# Patient Record
Sex: FEMALE | Race: WHITE | Employment: FULL TIME | URBAN - METROPOLITAN AREA
[De-identification: names, ages, dates, MRNs, and addresses within clinical notes are randomized per-mention and may not be internally consistent; named-entity substitution may affect disease eponyms.]

---

## 2021-10-06 ENCOUNTER — APPOINTMENT (OUTPATIENT)
Dept: PHYSICAL THERAPY | Age: 31
End: 2021-10-06

## 2021-12-30 ENCOUNTER — HOSPITAL ENCOUNTER (OUTPATIENT)
Dept: PHYSICAL THERAPY | Age: 31
Discharge: HOME | End: 2021-12-30
Payer: OTHER GOVERNMENT

## 2021-12-30 PROCEDURE — 97162 PT EVAL MOD COMPLEX 30 MIN: CPT

## 2021-12-30 PROCEDURE — 97112 NEUROMUSCULAR REEDUCATION: CPT

## 2021-12-30 PROCEDURE — 97535 SELF CARE MNGMENT TRAINING: CPT

## 2021-12-30 NOTE — PROGRESS NOTES
[facility]  [address] [place], [address]  Ph [phone]  Fx [phone]    Plan of Care/ Statement of Necessity for Physical Therapy Services    Patient name: Topher Berkowitz Start of Care: 2021   Referral source: Vonnie Bowens NP : 1990    Medical Diagnosis: Other symptoms and signs involving the genitourinary system [R39.89]   Onset Date:vaginal delivery 17    Treatment Diagnosis: Other symptoms and signs involving the genitourinary system [R39.89]   Prior Hospitalization: see medical history Provider#: [id number]   Medications: Verified on Patient summary List    Comorbidities: PMHx/Surgical Hx: 3 pregnancies, 2 vaginal deliveries with grade 1 tearing and both with hemorrhaging afterwards (of note pt delivered one stillborn child) ; Multiple sclerosis   Prior Level of Function: functionally independent, active lifestyle and mother          The Plan of Care and following information is based on the information from the initial evaluation. Assessment/ key information: Patient is a 33year old female presenting to Physical Therapy with c/o vaginal heaviness/pressure which is limiting ability function at previous level. Patient has pain complaints with intercourse and difficulty evacuating stool due to feeling of prolapse exacerbation. Patient presents with decreased strength, coordination, and endurance of the pelvic floor muscles and decreased strength of postural stabilizers. Patient presents with limited understanding of  bowel anatomy/function, dietary irritants, and urge suppression.  I feel patient would benefit from skilled therapeutic intervention to optimize highest functional level possible.      Patient will continue to benefit from skilled PT services to modify and progress therapeutic interventions, address functional mobility deficits, address ROM deficits, address strength deficits, analyze and address soft tissue restrictions, analyze and cue movement patterns, analyze and modify body mechanics/ergonomics and assess and modify postural abnormalities to attain remaining goals. Evaluation Complexity History HIGH Complexity :3+ comorbidities / personal factors will impact the outcome/ POC ; Examination MEDIUM Complexity : 3 Standardized tests and measures addressing body structure, function, activity limitation and / or participation in recreation  ;Presentation MEDIUM Complexity : Evolving with changing characteristics  ; Clinical Decision Making MEDIUM  Overall Complexity Rating: MEDIUM    Problem List: Pelvic pain/dysfunction, Decreased pelvic floor mm awareness, Decreased pelvic floor mm strength and Improper voiding habits  Treatment Plan may include any combination of the following: Therapeutic exercise, Urge suppression techniques, Neuromuscular re-education, Manual therapy, Physical agent/modality and Patient education  Patient / Family readiness to learn indicated by: asking questions, trying to perform skills and interest  Persons(s) to be included in education: patient (P)  Barriers to Learning/Limitations: None  Measures taken if barriers to learning: NA  Patient Goal (s): to strengthen my muscles and decrease the bulge feeling, also to enjoy intercourse more  Patient Self Reported Health Status: good  Rehabilitation Potential: good    Short term goals: To be achieved in 4 weeks:  Patient will be able to maintain pelvic floor contraction for 10 seconds, 10 repetitions with no accessory substitution or breath holding.   Status at eval: PFMC 5 seconds, 3 repetitions     Pt will demonstrate proper toileting posture and perform colon massage daily for improved bowel motility to improve QOL and  prevent weakening of PFM due to straining with BMs.    Eval: BM every 2-3 days, Type 4 and BM causes prolapse heaviness/pressure complaints to worsen      Patient will be able to use Pelvic organ prolapse management tools/exercises to reduce pelvic pressure/heaviness to less than VAS 2/10 after BM /end of day  Status at eval: always pressure 1/10 discomfort and worst 4/10 with BM and menstrual cycle        Long term goals: To be achieved in 10 weeks:  Patient will tolerate LG dilator, dynamic insertion for 10 minutes (or intercourse) with pain no more than 1/10 and less feeling of \"something is in the way\"  Status at eval: intercourse discomfort/pain 3/10 with deep penetration     Patient will demonstrate pelvic floor muscle contraction 4/5 and ability to maintain contraction for 10 seconds, 10 repetitions. Status at eval: PFM 3-/5, 4 second hold, 3 repetitions     Patient will note improvement in prolapse complaints evidenced by 75% less vaginal heaviness/pressure throughout the day  Status at Eval: Pt notes heaviness/pressure at vaginal opening ranging 1-4/10; never \"not present\"     Patient will demonstrate independence with management tools and exercise program that are beneficial for current condition in order to feel comfortable with Pelvic floor PT D/C and not fear exacerbation of current condition or symptoms returning. Status at eval : patient fearful of return to exercise and unaware of what activities to avoid to avoid exacerbation of current condition    Frequency / Duration: Patient to be seen 1 times per week for 10 weeks. Patient/ Caregiver education and instruction: Diagnosis, prognosis, Proper Voiding Habits, Diet, Pain Management, Exercises and Bladder Retraining   [x]  Plan of care has been reviewed with PTA    Certification Period: SALAS Childers, PT 12/30/2021 3:25 PM    ________________________________________________________________________    I certify that the above Therapy Services are being furnished while the patient is under my care. I agree with the treatment plan and certify that this therapy is necessary.     [de-identified] Signature:____________________  Date:____________Time:____________ Mary , NP      Please sign and return to [facility]  [address] [place], [address]  Ph [phone]  Fx [phone]

## 2021-12-30 NOTE — PROGRESS NOTES
Physical Therapy Evaluation        Patient Name: Geraldo Mueller  Date:2021  : 1990  [x]  Patient  Verified  Payor:  / Plan: Yovanny Sandoval 72 / Product Type:  /    In time:1145  Out time:1230  Total Treatment Time (min): 45  Visit #: 1 of 10    Medicare/BCBS Only   Total Timed Codes (min):  20 1:1 Treatment Time:  45       Treatment Area: Other symptoms and signs involving the genitourinary system [R39.89]    SUBJECTIVE  Pain Level (0-10 scale): 0  []constant []intermittent []improving []worsening []no change since onset    Any medication changes, allergies to medications, adverse drug reactions, diagnosis change, or new procedure performed?: [x] No    [] Yes (see summary sheet for update)  Subjective functional status/changes:     PLOF: functionally independent, active lifestyle and mother  Limitations to PLOF: Decreased strength, coordination and endurance  Mechanism of Injury: vaginal delivery 17  Current symptoms/Complaints: Patient c/o vaginal heaviness/pressure since second delivery; states labor felt different and since then, feels like \"a bulge or a ball\" and reports some days are worse than others, when closer to menstruation, worsens. Pt states she was told by GYN that \"it's not your bladder\" but recommended PF PT. Pt states that anything with squatting position is difficult and feels like she is unable to push to have BM \"because I am worried that its going to come out\" ; pt also c/o pain/discomfort with intercourse. Previous Treatment/Compliance: none  PMHx/Surgical Hx: 3 pregnancies, 2 vaginal deliveries with grade 1 tearing and both with hemorrhaging afterwards (of note pt delivered one stillborn child) ;  Multiple sclerosis  Work Hx:   Living Situation: 2 children and spouse (deployed)   Pt Goals: \"to strengthen my muscles and decrease the bulge feeling, also to enjoy intercourse more\"   Barriers: []pain []financial []time []transportation []other  Motivation: high  Substance use: [x]Alcohol [x]Tobacco []other:   Cognition: A & O x 4    Other:    Current urinary complaint  None    Daytime urinary frequency:  Every 3 hour(s) during the day    Nocturia:  0x/ night    Patient has failed previous pelvic floor muscle training? [] Yes    [x] No    Bowel function:  Constipation,  every 2-3 days BM  Stool Type ([place]): 4  Toileting position/modifications: IBS-C, standard toileting    Fecal Incontinence present? none    Pain complaint:  SIJ pain    Pain scale:  Verbal Analog scale: worst pain 5 \"just started recently\"     Pain description:  daily: intermittent  worsens with: increased activity/movement  improves with: rest    Vaginal heaviness/pressure/discomfort  Worst: 4/10 (menstration, BM, coughing)  Best: 1/10  [place] discomfort 3/10 with deep penetration     Diet: well balanced, \"I don't normally eat\" ; a lot of pasta    Fluid intake:    Fluid  Amount per day   Water 64 oz +   Caffeine 1/2 cup coffee   Alcohol 2 glasses wine in a week             Physical Exam Objective Findings:    Gait:  [x] Normal     [] Abnormal:    Pelvic Floor Assessment  Patient was educated on pelvic floor anatomy, structure, and function and implications for current presentation of s/s. Patient consents to pelvic floor assessment.      Pelvic girdle alignment:  level pelvis, no SI joint tenderness, no dysfunction or obliquity       Postural assessment:  stance with hyperlordotic lumbar spine    Range of Motion:  Patient moves freely through functional low back and hip ROM     External trigger point/ muscle tenderness:    Superficial PFM tenderness/restriction none    PFM Screen:    Skin Integrity:  [x] Healthy [] Red  [] Labia Atrophy [] Fragile    Sensation: [x] Intact [] Diminished:    Muscle Bulk: [x] Symmetrical  [] Well-developed [] Atrophied:  []L   []R   []B    Prolapse: [] Cystocele:   [] Rectocele:  [x] Uterine prolapse: likely grade 2, did not assess due to menstrual cycle starting today    Ability to actively bulge: min  Bulge with cough mod; bulge mod with knack prior to cough    Pelvic floor manual exam: Performed via internal vaginal assessment  female-upon vaginal palpation of the pelvic floor, the following was noted: mild hypotonicity, mild decreased sensation  Introitus restriction/TTP (reported as hands on a clock):  none    Deep PFM Tenderness/Restriction:  No pain    Pelvic floor MMT    PERF (Performance/Endurance/Repetitions//Flicks): 3-/9/1/5    Pelvic muscle release pattern  3 - good release      25 min [x]Eval                  []Re-Eval       10 min Self Care: Review and handout provided on the following: PFM anatomy, structure and function as it pertains to current s/s, complaints and condition. Reviewed expectations for PFPT and POC. HEP 5-10/5-10/10, 1-2/2-4/10 2-3 times/day sitting/supine, supine if heaviness/pressure    Rationale:  Increase awareness and understanding of current condition to improve patients ability to independently and effectively attain goals and progress towards long term management of current condition. 10 min Neuromuscular Re-education:  []  See flow sheet :via internal digital facilitation and feedback for long and short isolation lulu   Rationale: increase strength, improve coordination and increase proprioception  to improve the patients ability to progress towards goals            With   [] TE   [] TA   [x] neuro   [x] other: Patient Education: [x] Review HEP    [] Progressed/Changed HEP based on:   [] positioning   [] body mechanics   [] transfers   [] heat/ice application    [] other:           Pain Level (0-10 scale) post treatment: 0    ASSESSMENT/Changes in Function: Patient is a 33year old female presenting to Physical Therapy with c/o vaginal heaviness/pressure which is limiting ability function at previous level.  Patient has pain complaints with intercourse and difficulty evacuating stool due to feeling of prolapse exacerbation. Patient presents with decreased strength, coordination, and endurance of the pelvic floor muscles and decreased strength of postural stabilizers. Patient presents with limited understanding of  bowel anatomy/function, dietary irritants, and urge suppression. I feel patient would benefit from skilled therapeutic intervention to optimize highest functional level possible. Patient will continue to benefit from skilled PT services to modify and progress therapeutic interventions, address functional mobility deficits, address ROM deficits, address strength deficits, analyze and address soft tissue restrictions, analyze and cue movement patterns, analyze and modify body mechanics/ergonomics and assess and modify postural abnormalities to attain remaining goals. [x]  See Plan of Care  []  See progress note/recertification  []  See Discharge Summary         Progress towards goals / Updated goals:  Short term goals: To be achieved in 4 weeks:  Patient will be able to maintain pelvic floor contraction for 10 seconds, 10 repetitions with no accessory substitution or breath holding. Status at eval: PFMC 5 seconds, 3 repetitions    Pt will demonstrate proper toileting posture and perform colon massage daily for improved bowel motility to improve QOL and  prevent weakening of PFM due to straining with BMs. Eval: BM every 2-3 days, Type 4 and BM causes prolapse heaviness/pressure complaints to worsen     Patient will be able to use Pelvic organ prolapse management tools/exercises to reduce pelvic pressure/heaviness to less than VAS 2/10 after BM /end of day  Status at eval: always pressure 1/10 discomfort and worst 4/10 with BM and menstrual cycle      Long term goals:  To be achieved in 10 weeks:  Patient will tolerate LG dilator, dynamic insertion for 10 minutes (or intercourse) with pain no more than 1/10 and less feeling of \"something is in the way\"  Status at eval: intercourse discomfort/pain 3/10 with deep penetration    Patient will demonstrate pelvic floor muscle contraction 4/5 and ability to maintain contraction for 10 seconds, 10 repetitions. Status at eval: PFM 3-/5, 4 second hold, 3 repetitions    Patient will note improvement in prolapse complaints evidenced by 75% less vaginal heaviness/pressure throughout the day  Status at Eval: Pt notes heaviness/pressure at vaginal opening ranging 1-4/10; never \"not present\"    Patient will demonstrate independence with management tools and exercise program that are beneficial for current condition in order to feel comfortable with Pelvic floor PT D/C and not fear exacerbation of current condition or symptoms returning.    Status at eval : patient fearful of return to exercise and unaware of what activities to avoid to avoid exacerbation of current condition    PLAN  []  Upgrade activities as tolerated     [x]  Continue plan of care  []  Update interventions per flow sheet       []  Discharge due to:_  []  Other:_  GOAL: core/PF strength for prolapse; plan: (give/set justine next session for 5/5/10, 2/2/10); f/u bowel routine: posture, ILU, elongation; hypopressives; biofeedback;  core progressions, functional lifting/carrying; (dilator as needed if appropriate, likely just needs MT and PF strength/hypopressives)    Maddison Carbone, PT 12/30/2021  11:48 AM

## 2022-01-04 ENCOUNTER — HOSPITAL ENCOUNTER (OUTPATIENT)
Dept: PHYSICAL THERAPY | Age: 32
Discharge: HOME | End: 2022-01-04
Payer: OTHER GOVERNMENT

## 2022-01-04 PROCEDURE — 97112 NEUROMUSCULAR REEDUCATION: CPT

## 2022-01-04 PROCEDURE — 97535 SELF CARE MNGMENT TRAINING: CPT

## 2022-01-04 NOTE — PROGRESS NOTES
PT DAILY TREATMENT NOTE    Patient Name: Lelo Blood  Date:2022  : 1990  [x]  Patient  Verified  Payor:  / Plan: Yovanny Sandoval 72 / Product Type:  /    In time:255  Out time:337  Total Treatment Time (min): 42  Total Timed Codes (min): 42  1:1 Treatment Time ([address] only): 42   Visit #: 2 of 10    Treatment Area: Other symptoms and signs involving the genitourinary system [R39.89]    SUBJECTIVE  Pain Level (0-10 scale): 0/10  Any medication changes, allergies to medications, adverse drug reactions, diagnosis change, or new procedure performed?: [x] No    [] Yes (see summary sheet for update)  Subjective functional status/changes:   [] No changes reported  Patient reports:mixed compliance with current HEP. She stated that she had difficulty with dong the quicks. OBJECTIVE        10 min Self Care: Thorough review and handout provided on the following: Helped install [employer] justine, bowel routine, fiber, and abdominal massage   Rationale:  Increase awareness and understanding of current condition to improve patients ability to independently and effectively attain goals and progress towards long term management of current condition. 32 min Neuromuscular Re-education:  [x]  See flow sheet : surface EMG   Rationale: Increase pelvic floor muscle strength, Improve quality of pelvic floor contractions and Decrease resting tone of the pelvic floor in order to Improve frequency and ease of bowel movements, Improve ability to engage in sexual intercourse and Improve ability to perform ADLs.             With   [] TE   [] TA   [] neuro   [] other: Patient Education: [x] Review HEP    [] Progressed/Changed HEP based on:   [] positioning   [] body mechanics   [] transfers   [] heat/ice application    [] other:      Other Objective/Functional Measures: Biofeedback expectations and implications were reviewed with patient prior to intervention,   Performed sEMG with perianal electrodes as follows:    Position, initial resting tone Work/Rest/Reps Comments :Average   Supine   5/10/10     Resting at 3.9   uV min = .5 uV     Work at   18.7    uV  Max = 57.0 uV   Supine     2/4/10   Resting at  5. Beula Poisson = .6 uV     working at 14.5 uV  Max = 36.8 uV     Neuromuscular Re-education was provided with visual, verbal and tactile cueing throughout intervention  Results and implications for treatment and HEP were reviewed in detail with patient during and following today's intervention. Pain Level (0-10 scale) post treatment: 0/10    ASSESSMENT/Changes in Function: Patient tolerated today's session well with no c/o pain. Patient demonstrated understanding of today's instruction, education, and recommendations. Patient is progressing appropriately towards goals. Surface EMG was performed to obtain a baseline for her pelvic floor activity. Patient was able to contract and relax the pelvic floor well. Her work to rest was delayed which is reflected in the average rest.  She demonstrated good muscle recruitment. Bowel routine, fiber consumption, defecation positioning, and abdominal massage was discussed. Patient will continue to benefit from skilled PT services to modify and progress therapeutic interventions, address functional mobility deficits, address ROM deficits, address strength deficits, analyze and address soft tissue restrictions, analyze and cue movement patterns, analyze and modify body mechanics/ergonomics, assess and modify postural abnormalities and instruct in home and community integration to attain remaining goals. [x]  See Plan of Care  []  See progress note/recertification  []  See Discharge Summary         Progress towards goals / Updated goals:  Short term goals: To be achieved in 4 weeks:  Patient will be able to maintain pelvic floor contraction for 10 seconds, 10 repetitions with no accessory substitution or breath holding.   Status at [facility]: [facility] 5 seconds, 3 repetitions   Current:  Patient installed the [address] justine and surface EMG was performed. 1/4/2022 Progressing    Pt will demonstrate proper toileting posture and perform colon massage daily for improved bowel motility to improve QOL and  prevent weakening of PFM due to straining with BMs.    Eval: BM every 2-3 days, Type 4 and BM causes prolapse heaviness/pressure complaints to worsen      Patient will be able to use Pelvic organ prolapse management tools/exercises to reduce pelvic pressure/heaviness to less than VAS 2/10 after BM /end of day  Status at eval: always pressure 1/10 discomfort and worst 4/10 with BM and menstrual cycle        Long term goals: To be achieved in 10 weeks:  Patient will tolerate LG dilator, dynamic insertion for 10 minutes (or intercourse) with pain no more than 1/10 and less feeling of \"something is in the way\"  Status at eval: intercourse discomfort/pain 3/10 with deep penetration     Patient will demonstrate pelvic floor muscle contraction 4/5 and ability to maintain contraction for 10 seconds, 10 repetitions. Status at eval: PFM 3-/5, 4 second hold, 3 repetitions     Patient will note improvement in prolapse complaints evidenced by 75% less vaginal heaviness/pressure throughout the day  Status at Eval: Pt notes heaviness/pressure at vaginal opening ranging 1-4/10; never \"not present\"     Patient will demonstrate independence with management tools and exercise program that are beneficial for current condition in order to feel comfortable with Pelvic floor PT D/C and not fear exacerbation of current condition or symptoms returning.    Status at eval : patient fearful of return to exercise and unaware of what activities to avoid to avoid exacerbation of current condition       PLAN  []  Upgrade activities as tolerated     [x]  Continue plan of care  [x]  Update interventions per flow sheet       []  Discharge due to:_  []  Other:_  GOAL: core/PF strength for prolapse; plan: (give/set justine next session for 5/5/10, 2/2/10); f/u bowel routine: posture, ILU, elongation; hypopressives; biofeedback;  core progressions, functional lifting/carrying; (dilator as needed if appropriate, likely just needs MT and PF strength/hypopressives    Pamela Cullen, PT 1/4/2022  2:53 PM    Future Appointments   Date Time Provider Jerry Fajardo   1/14/2022  2:45 PM [facility]   1/20/2022  3:30 PM Shelbie Brown, GUERO [facility]   1/26/2022  2:45 PM [facility]

## 2022-01-14 ENCOUNTER — HOSPITAL ENCOUNTER (OUTPATIENT)
Dept: PHYSICAL THERAPY | Age: 32
Discharge: HOME | End: 2022-01-14
Payer: OTHER GOVERNMENT

## 2022-01-14 PROCEDURE — 97112 NEUROMUSCULAR REEDUCATION: CPT

## 2022-01-14 PROCEDURE — 97530 THERAPEUTIC ACTIVITIES: CPT

## 2022-01-14 PROCEDURE — 97110 THERAPEUTIC EXERCISES: CPT

## 2022-01-14 PROCEDURE — 97535 SELF CARE MNGMENT TRAINING: CPT

## 2022-01-14 NOTE — PROGRESS NOTES
PT DAILY TREATMENT NOTE    Patient Name: Army Crisostomo  Date:2022  : 1990  [x]  Patient  Verified  Payor:  / Plan: Yovanny Sandoval 72 / Product Type:  /    In time:2:51 PM  Out time:3:31 PM  Total Treatment Time (min): 40  Total Timed Codes (min): 40  1:1 Treatment Time ( [address] only): NA   Visit #: 3 of 10    Treatment Area: Other symptoms and signs involving the genitourinary system [R39.89]    SUBJECTIVE  Pain Level (0-10 scale): 0/10  Any medication changes, allergies to medications, adverse drug reactions, diagnosis change, or new procedure performed?: [x] No    [] Yes (see summary sheet for update)  Subjective functional status/changes:   [] No changes reported  Patient reports: fair compliance with current HEP. Patient reports that she was very fatigued this week and was unable to perform all of her [facility]. OBJECTIVE    10 min Therapeutic Exercise:  [x] See flow sheet :   Rationale: Increase pelvic floor muscle strength, Improve quality of pelvic floor contractions, Decrease resting tone of the pelvic floor, Increase tissue extensibility of the pelvic floor muscles, Increase core strength, Inhibit abnormal muscle activity and Improve lumbosacral and coccygeal mobility in order to Increase urinary continence, Decrease urinary urgency, Increase ability to delay urination, Decrease frequency of urination, Decrease nocturia, Increase fecal continence, Decrease bowel urgency, Improve ability to engage in sexual intercourse and Improve ability to perform ADLs. 10 min Self Care: Thorough review and handout provided on the following:   -follow up on \"ILU\" massage  -education provided on prolapse  -review of core HEP (see chart for handout)   Rationale:  Increase awareness and understanding of current condition to improve patients ability to independently and effectively attain goals and progress towards long term management of current condition.      10 min Therapeutic Activity:  [x]  See flow sheet :   Rationale: Increase pelvic floor muscle strength, Improve quality of pelvic floor contractions, Decrease resting tone of the pelvic floor, Increase tissue extensibility of the pelvic floor muscles, Inhibit abnormal muscle activity and Improve lumbosacral and coccygeal mobility in order to Increase urinary continence, Decrease urinary urgency, Increase ability to delay urination, Decrease frequency of urination, Decrease nocturia, Increase fecal continence, Improve frequency and ease of bowel movements and Improve ability to perform ADLs. 10 min Neuromuscular Re-education:  [x]  See flow sheet :   Rationale: Increase pelvic floor muscle strength, Improve quality of pelvic floor contractions, Decrease resting tone of the pelvic floor, Increase tissue extensibility of the pelvic floor muscles, Increase core strength, Inhibit abnormal muscle activity and Improve lumbosacral and coccygeal mobility in order to Increase urinary continence, Decrease urinary urgency, Increase ability to delay urination, Decrease frequency of urination, Increase fecal continence, Decrease bowel urgency, Improve ability to engage in sexual intercourse and Improve ability to perform ADLs. With   [] TE   [] TA   [] neuro   [] other: Patient Education: [x] Review HEP    [] Progressed/Changed HEP based on:   [] positioning   [] body mechanics   [] transfers   [] heat/ice application    [] other:      Other Objective/Functional Measures:      Pain Level (0-10 scale) post treatment: 0/10    ASSESSMENT/Changes in Function: Patient tolerated today's session well with no c/o pain. Patient demonstrated understanding of today's instruction, education, and recommendations. Patient is progressing appropriately towards goals.  Patient making slow progress towards goals with inability to be compliant at home contributing due to patient's busy home life---may consider increasing patient's physical therapy attendance to 2x/week at next PN. Patient does well with exercises (hips elevated) and denies pressure/heaviness today. Patient requires maximal cueing, verbal and tactile, for appropriate TA activation--noted patient likes to substitute with rectus bulge. Patient will continue to benefit from skilled PT services to modify and progress therapeutic interventions, address functional mobility deficits, address ROM deficits, address strength deficits, analyze and address soft tissue restrictions, analyze and cue movement patterns, analyze and modify body mechanics/ergonomics, assess and modify postural abnormalities and instruct in home and community integration to attain remaining goals. [x]  See Plan of Care  []  See progress note/recertification  []  See Discharge Summary                Progress towards goals / Updated goals:  Short term goals: To be achieved in 4 weeks:  Patient will be able to maintain pelvic floor contraction for 10 seconds, 10 repetitions with no accessory substitution or breath holding. Status at eval: PFMC 5 seconds, 3 repetitions   Current:  Patient installed the [address] justine and surface EMG was performed. 1/4/2022 Progressing     Pt will demonstrate proper toileting posture and perform colon massage daily for improved bowel motility to improve QOL and  prevent weakening of PFM due to straining with BMs.    Eval: BM every 2-3 days, Type 4 and BM causes prolapse heaviness/pressure complaints to worsen   1/14/22: PROGRESSING, patient reports having a BM everyday without complaints of heaviness/pressure     Patient will be able to use Pelvic organ prolapse management tools/exercises to reduce pelvic pressure/heaviness to less than VAS 2/10 after BM /end of day  Status at eval: always pressure 1/10 discomfort and worst 4/10 with BM and menstrual cycle  1/14/22: PROGRESSING, patient does well with exercises void of adverse effect, denies pelvic pressure/heaviness        Long term goals:  To be achieved in 10 weeks:  Patient will tolerate LG dilator, dynamic insertion for 10 minutes (or intercourse) with pain no more than 1/10 and less feeling of \"something is in the way\"  Status at eval: intercourse discomfort/pain 3/10 with deep penetration     Patient will demonstrate pelvic floor muscle contraction 4/5 and ability to maintain contraction for 10 seconds, 10 repetitions. Status at eval: PFM 3-/5, 4 second hold, 3 repetitions     Patient will note improvement in prolapse complaints evidenced by 75% less vaginal heaviness/pressure throughout the day  Status at Eval: Pt notes heaviness/pressure at vaginal opening ranging 1-4/10; never \"not present\"     Patient will demonstrate independence with management tools and exercise program that are beneficial for current condition in order to feel comfortable with Pelvic floor PT D/C and not fear exacerbation of current condition or symptoms returning. Status at eval : patient fearful of return to exercise and unaware of what activities to avoid to avoid exacerbation of current condition        PLAN  []? Upgrade activities as tolerated     [x]? Continue plan of care  [x]? Update interventions per flow sheet       []? Discharge due to:_  [x]?   Other:_  GOAL: core/PF strength for prolapse; plan: (give/set justine next session for 5/5/10, 2/2/10); f/u bowel routine: posture, ILU, elongation; hypopressives; biofeedback;  core progressions, functional lifting/carrying; (dilator as needed if appropriate, likely just needs MT and PF strength/hypopressives    Sue Rand 1/14/2022  8:20 AM    Future Appointments   Date Time Provider Jerry Fajardo   1/14/2022  2:45 PM [facility]   1/20/2022  2:00 PM Gearl Heart, PT [facility]   1/26/2022  2:45 PM [facility]

## 2022-01-20 ENCOUNTER — HOSPITAL ENCOUNTER (OUTPATIENT)
Dept: PHYSICAL THERAPY | Age: 32
Discharge: HOME | End: 2022-01-20
Payer: OTHER GOVERNMENT

## 2022-01-20 PROCEDURE — 97535 SELF CARE MNGMENT TRAINING: CPT

## 2022-01-20 PROCEDURE — 97530 THERAPEUTIC ACTIVITIES: CPT

## 2022-01-20 PROCEDURE — 97110 THERAPEUTIC EXERCISES: CPT

## 2022-01-20 NOTE — PROGRESS NOTES
PT DAILY TREATMENT NOTE    Patient Name: Deisy Maya  Date:2022  : 1990  [x]  Patient  Verified  Payor:  / Plan: Yovanny Sandoval 72 / Product Type:  /    In time:200  Out time:245  Total Treatment Time (min): 45  Total Timed Codes (min): 45  1:1 Treatment Time ( [address] only): 45   Visit #: 4 of 10    Treatment Area: Other symptoms and signs involving the genitourinary system [R39.89]    SUBJECTIVE  Pain Level (0-10 scale): o/10  Any medication changes, allergies to medications, adverse drug reactions, diagnosis change, or new procedure performed?: [x] No    [] Yes (see summary sheet for update)  Subjective functional status/changes:   [] No changes reported  Patient reports: good  compliance with current HEP. Patient reports less heaviness during her menstrual cycle. OBJECTIVE    25 min Therapeutic Exercise:  [x] See flow sheet :   Rationale: Increase pelvic floor muscle strength, Improve quality of pelvic floor contractions, Increase core strength, Inhibit abnormal muscle activity and Improve lumbosacral and coccygeal mobility in order to Increase urinary continence and Improve ability to perform ADLs. 10 min Self Care: Thorough review and handout provided on the following: Discussed dilators and weights   Rationale:  Increase awareness and understanding of current condition to improve patients ability to independently and effectively attain goals and progress towards long term management of current condition. 10 min Therapeutic Activity:  [x]  See flow sheet :   Rationale: Increase pelvic floor muscle strength, Improve quality of pelvic floor contractions, Increase core strength, Inhibit abnormal muscle activity and Improve lumbosacral and coccygeal mobility in order to Increase urinary continence, Improve ability to engage in sexual intercourse and Improve ability to perform ADLs.               With   [] TE   [] TA   [] neuro   [] other: Patient Education: [x] Review HEP    [] Progressed/Changed HEP based on:   [] positioning   [] body mechanics   [] transfers   [] heat/ice application    [] other:         Pain Level (0-10 scale) post treatment: 0/10    ASSESSMENT/Changes in Function: Patient tolerated today's session well with no c/o pain. Patient demonstrated understanding of today's instruction, education, and recommendations. Patient is progressing appropriately towards goals. Patient reports feeling less heaviness during her menstrual cycle than previously. Patient will continue to benefit from skilled PT services to modify and progress therapeutic interventions, address functional mobility deficits, address ROM deficits, address strength deficits, analyze and address soft tissue restrictions, analyze and cue movement patterns and analyze and modify body mechanics/ergonomics to attain remaining goals. [x]  See Plan of Care  []  See progress note/recertification  []  See Discharge Summary         Progress towards goals / Updated goals:  Short term goals: To be achieved in 4 weeks:  Patient will be able to maintain pelvic floor contraction for 10 seconds, 10 repetitions with no accessory substitution or breath holding.   Status at eval: PFMC 5 seconds, 3 repetitions   Current:  Patient installed the [employer] justine and surface EMG was performed.  1/4/2022 Progressing     Pt will demonstrate proper toileting posture and perform colon massage daily for improved bowel motility to improve QOL and  prevent weakening of PFM due to straining with BMs.    Eval: BM every 2-3 days, Type 4 and BM causes prolapse heaviness/pressure complaints to worsen   1/14/22: PROGRESSING, patient reports having a BM everyday without complaints of heaviness/pressure     Patient will be able to use Pelvic organ prolapse management tools/exercises to reduce pelvic pressure/heaviness to less than VAS 2/10 after BM /end of day  Status at eval: always pressure 1/10 discomfort and worst 4/10 with BM and menstrual cycle  1/14/22: PROGRESSING, patient does well with exercises void of adverse effect, denies pelvic pressure/heaviness  1/19/22 Patient reports less heaviness with her menstrual cycle than previously.          Long term goals: To be achieved in 10 weeks:  Patient will tolerate LG dilator, dynamic insertion for 10 minutes (or intercourse) with pain no more than 1/10 and less feeling of \"something is in the way\"  Status at eval: intercourse discomfort/pain 3/10 with deep penetration     Patient will demonstrate pelvic floor muscle contraction 4/5 and ability to maintain contraction for 10 seconds, 10 repetitions. Status at eval: PFM 3-/5, 4 second hold, 3 repetitions     Patient will note improvement in prolapse complaints evidenced by 75% less vaginal heaviness/pressure throughout the day  Status at Eval: Pt notes heaviness/pressure at vaginal opening ranging 1-4/10; never \"not present\"     Patient will demonstrate independence with management tools and exercise program that are beneficial for current condition in order to feel comfortable with Pelvic floor PT D/C and not fear exacerbation of current condition or symptoms returning.    Status at eval : patient fearful of return to exercise and unaware of what activities to avoid to avoid exacerbation of current condition    PLAN  []  Upgrade activities as tolerated     [x]  Continue plan of care  [x]  Update interventions per flow sheet       []  Discharge due to:_  []  Other:_  GOAL: core/PF strength for prolapse; plan: (give/set justine next session for 5/5/10, 2/2/10); f/u bowel routine: posture, ILU, elongation; hypopressives; biofeedback;  core progressions, functional lifting/carrying; (dilator as needed if appropriate, likely just needs MT and PF strength/hypopressives       Katharine Trujillo, PT 1/20/2022  1:53 PM    Future Appointments   Date Time Provider Jerry Fajardo   1/20/2022  2:00 PM Osorio Saldivar, [address] [facility]   1/26/2022  2:45 PM Elaine Flores [facility] [address]

## 2022-01-26 ENCOUNTER — APPOINTMENT (OUTPATIENT)
Dept: PHYSICAL THERAPY | Age: 32
End: 2022-01-26
Payer: OTHER GOVERNMENT

## 2022-02-02 ENCOUNTER — TELEPHONE (OUTPATIENT)
Dept: PHYSICAL THERAPY | Age: 32
End: 2022-02-02

## 2022-02-22 ENCOUNTER — TELEPHONE (OUTPATIENT)
Dept: PHYSICAL THERAPY | Age: 32
End: 2022-02-22

## 2022-04-11 NOTE — PROGRESS NOTES
[facility] at [address], [address] [facility] [place]  Ph [phone]  Fx [phone]    Physical Therapy Discharge Summary      Goals/Measure of Progress:  Patient name: Deisy Maya Start of Care: 2021   Referral source: Kwasi Elliott NP : 1990                Medical Diagnosis: Other symptoms and signs involving the genitourinary system [R39.89]    Onset Date:vaginal delivery 19                Treatment Diagnosis: Other symptoms and signs involving the genitourinary system [R39.89]   Prior Hospitalization: see medical history Provider#: [id number]   Medications: Verified on Patient summary List    Comorbidities: PMHx/Surgical Hx: 3 pregnancies, 2 vaginal deliveries with grade 1 tearing and both with hemorrhaging afterwards (of note pt delivered one stillborn child) ; Multiple sclerosis   Prior Level of Function: functionally independent, active lifestyle and mother                      Visits from Start of Care: 4    Missed Visits: 2    Reporting Period : 2022 to 2022    Goals/Measure of Progress:  Short term goals:   Patient will be able to maintain pelvic floor contraction for 10 seconds, 10 repetitions with no accessory substitution or breath holding. Status at eval: PFMC 5 seconds, 3 repetitions   Current:  Patient installed the [employer] justine and surface EMG was performed.  2022 Progressing  2022 Patient did not return to therapy . D/C GOAL     Pt will demonstrate proper toileting posture and perform colon massage daily for improved bowel motility to improve QOL and  prevent weakening of PFM due to straining with BMs.    Eval: BM every 2-3 days, Type 4 and BM causes prolapse heaviness/pressure complaints to worsen   22: PROGRESSING, patient reports having a BM everyday without complaints of heaviness/pressure  2022 Patient did not return to therapy .   D/C GOAL     Patient will be able to use Pelvic organ prolapse management tools/exercises to reduce pelvic pressure/heaviness to less than VAS 2/10 after BM /end of day  Status at eval: always pressure 1/10 discomfort and worst 4/10 with BM and menstrual cycle  1/14/22: PROGRESSING, patient does well with exercises void of adverse effect, denies pelvic pressure/heaviness  1/20/22 Patient reports less heaviness with her menstrual cycle than previously. 4/11/2022 Patient did not return to therapy . D/C GOAL         Long term goals:   Patient will tolerate LG dilator, dynamic insertion for 10 minutes (or intercourse) with pain no more than 1/10 and less feeling of \"something is in the way\"  Status at eval: intercourse discomfort/pain 3/10 with deep penetration  4/11/2022 Patient did not return to therapy . D/C GOAL     Patient will demonstrate pelvic floor muscle contraction 4/5 and ability to maintain contraction for 10 seconds, 10 repetitions. Status at eval: PFM 3-/5, 4 second hold, 3 repetitions  4/11/2022 Patient did not return to therapy . D/C GOAL     Patient will note improvement in prolapse complaints evidenced by 75% less vaginal heaviness/pressure throughout the day  Status at Eval: Pt notes heaviness/pressure at vaginal opening ranging 1-4/10; never \"not present\"  4/11/2022 Patient did not return to therapy . D/C GOAL     Patient will demonstrate independence with management tools and exercise program that are beneficial for current condition in order to feel comfortable with Pelvic floor PT D/C and not fear exacerbation of current condition or symptoms returning. Status at eval : patient fearful of return to exercise and unaware of what activities to avoid to avoid exacerbation of current condition  4/11/2022 Patient did not return to therapy . D/C GOAL                                                    Assessment/ Summary of Care:  Patient is a 33year old female who attended four pelvic floor physical therapy appointments for prolapse and dyspareunia.   Patient was progressing towards her goals but stopped attending therapy for unknown reasons. Patient will be discharged from therapy at this time.     Thank you for the referral.      RECOMMENDATIONS:  [x]Discontinue therapy: []Patient has reached or is progressing toward set goals      [x]Patient is non-compliant or has abdicated      []Due to lack of appreciable progress towards set goals    Janus Hodgkins, PT 4/11/2022 2:12 PM